# Patient Record
Sex: FEMALE | Race: WHITE | Employment: FULL TIME | URBAN - METROPOLITAN AREA
[De-identification: names, ages, dates, MRNs, and addresses within clinical notes are randomized per-mention and may not be internally consistent; named-entity substitution may affect disease eponyms.]

---

## 2023-04-03 ENCOUNTER — HOSPITAL ENCOUNTER (EMERGENCY)
Age: 28
Discharge: HOME | End: 2023-04-03
Attending: EMERGENCY MEDICINE
Payer: OTHER GOVERNMENT

## 2023-04-03 DIAGNOSIS — K59.00 CONSTIPATION, UNSPECIFIED CONSTIPATION TYPE: Primary | ICD-10-CM

## 2023-04-03 PROCEDURE — 99283 EMERGENCY DEPT VISIT LOW MDM: CPT

## 2023-04-03 RX ORDER — BISACODYL 5 MG
5 TABLET, DELAYED RELEASE (ENTERIC COATED) ORAL DAILY
Qty: 30 TABLET | Refills: 0 | Status: SHIPPED | OUTPATIENT
Start: 2023-04-03

## 2023-04-03 RX ORDER — ADHESIVE BANDAGE
30 BANDAGE TOPICAL DAILY
Qty: 240 ML | Refills: 0 | Status: SHIPPED | OUTPATIENT
Start: 2023-04-03

## 2023-04-03 NOTE — ED TRIAGE NOTES
Pt came into ED with complaints of constipation. Last bowel movement was 3/28/23. Pt had a baby 3 days ago.

## 2023-04-03 NOTE — ED NOTES
Bedside and Verbal shift change report given to Joshua Carcamo (oncoming nurse) by Karissa (offgoing nurse). Report included the following information SBAR, ED Summary, and MAR.

## 2023-04-03 NOTE — ED PROVIDER NOTES
[facility] EMERGENCY DEPT  EMERGENCY DEPARTMENT ENCOUNTER       Pt Name: Lorrie Reynolds  MRN: [id number]  [place] 1995  Date of evaluation: 4/3/2023  Provider: Abdon Vicente MD   PCP: None  Note Started: 3:26 AM 4/3/23     CHIEF COMPLAINT       Chief Complaint   Patient presents with    Constipation     Patient had a vaginal birth 4 days ago and has not been able to poop since Tues of last week. HISTORY OF PRESENT ILLNESS: 1 or more elements      History From: Patient       Lorrie Reynolds is a 34 y.o. female with history of vaginal delivery on 3/30 who presents with rectal pressure and lower abdominal pain. Reports she has not had a bowel movement in 6 days. She took a few doses of oxycodone for pain after delivery but has not had any in 2 days. She reports that she has developed some external hemorrhoids but denies any rectal bleeding. States she has been taking stool softeners, but unable to have a bowel movement and states that it feels like there is a large ball of stool stuck in her rectum that she is unable to pass. Vaginal bleeding is minimal.  Patient denies any fevers, chills, nausea, vomiting. She is breast-feeding and states that she needs to be home in 2 hours and 53 minutes to feed her baby. She also reports that she has abdominal wall hernia and is wearing an abdominal binder         PAST HISTORY     Past Medical History:  History reviewed. No pertinent past medical history. Past Surgical History:  History reviewed. No pertinent surgical history. Family History:  History reviewed. No pertinent family history.     Social History:  Social History     Tobacco Use    Smoking status: Never    Smokeless tobacco: Never   Substance Use Topics    Alcohol use: Not Currently    Drug use: Never       Allergies:  No Known Allergies    CURRENT MEDICATIONS      Previous Medications    No medications on file       PHYSICAL EXAM      ED Triage Vitals [04/03/23 0203]   ED Encounter Vitals Group      BP (!) 154/73      Pulse (Heart Rate) (!) 106      Resp Rate 22      Temp 97.5 °F (36.4 °C)      Temp src       O2 Sat (%) 100 %      Weight 148 lb 2.4 oz      Height 5' 3\"        Physical Exam  Vitals and nursing note reviewed. Constitutional:       Appearance: She is normal weight. HENT:      Mouth/Throat:      Mouth: Mucous membranes are moist.   Cardiovascular:      Rate and Rhythm: Regular rhythm. Tachycardia present. Heart sounds: Normal heart sounds. No murmur heard. Pulmonary:      Effort: Pulmonary effort is normal.      Breath sounds: Normal breath sounds. No wheezing or rales. Abdominal:      Tenderness: There is abdominal tenderness. There is no guarding. Hernia: A hernia (Ventral hernia present/diastases recti) is present. Genitourinary:     Comments: On rectal exam, patient has 2 small, nonbleeding external hemorrhoids. There is firm stool present in rectal vault. Attempted to disimpact, however patient was not able to tolerate any attempts at disimpaction and insisted that I stop immediately. .  Musculoskeletal:         General: Normal range of motion. Skin:     General: Skin is warm. Capillary Refill: Capillary refill takes less than 2 seconds. Coloration: Skin is not jaundiced. Findings: No erythema. Neurological:      Mental Status: She is alert and oriented to person, place, and time. Psychiatric:      Comments: Anxious                 EMERGENCY DEPARTMENT COURSE and DIFFERENTIAL DIAGNOSIS/MDM   Vitals:    Vitals:    04/03/23 0203   BP: (!) 154/73   Pulse: (!) 106   Resp: 22   Temp: 97.5 °F (36.4 °C)   SpO2: 100%   Weight: 67.2 kg (148 lb 2.4 oz)   Height: 5' 3\" (1.6 m)        Patient was given the following medications:  Medications - No data to display            CC/HPI Summary, DDx, ED Course, and Reassessment:   Patient presents with constipation since before her vaginal delivery on 3/30.   On exam has fecal impaction, however will not allow me to disimpact her.  We will try soapsuds enema. Patient only able to tolerate partial soapsuds enema (about 300 cc) which was not successful. Patient reports it feels like stool has moved down somewhat and she would like me to try to disimpact again. Attempted to disimpact again. Patient again insisted that I stop immediately and would like to try rest of soapsuds enema instead. Patient now stating that she does not want the rest of her soapsuds enema and would rather take medications by mouth to help with constipation. Will prescribe milk of magnesia, Dulcolax and encourage her to keep taking stool softener. Return to ED for worsening symptoms. FINAL IMPRESSION     1. Constipation, unspecified constipation type          DISPOSITION/PLAN   Discharged         PATIENT REFERRED TO:  Follow-up Information       Follow up With Specialties Details Why Contact Info    [facility] EMERGENCY DEPT Emergency Medicine   [address] [place] [address] [facility]  [phone]              DISCHARGE MEDICATIONS:  Current Discharge Medication List        START taking these medications    Details   magnesium hydroxide (Milk of Magnesia) 400 mg/5 mL suspension Take 30 mL by mouth daily. Qty: 240 mL, Refills: 0  Start date: 4/3/2023      bisacodyL (Dulcolax, bisacodyl,) 5 mg EC tablet Take 1 Tablet by mouth daily. Qty: 30 Tablet, Refills: 0  Start date: 4/3/2023               DISCONTINUED MEDICATIONS:  Current Discharge Medication List          I am the Primary Clinician of Record. Roc Luther MD (electronically signed)    (Please note that parts of this dictation were completed with voice recognition software. Quite often unanticipated grammatical, syntax, homophones, and other interpretive errors are inadvertently transcribed by the computer software. Please disregards these errors.  Please excuse any errors that have escaped final proofreading.)

## 2023-04-03 NOTE — ED NOTES
Pt not wanting enema, just wanting to take oral medication and go home. Dr Stephanie Prescott notified. Discussed discharge instructions with pt-state understanding. Pt ambulatory with steady gait to waiting room.